# Patient Record
Sex: FEMALE | Race: WHITE
[De-identification: names, ages, dates, MRNs, and addresses within clinical notes are randomized per-mention and may not be internally consistent; named-entity substitution may affect disease eponyms.]

---

## 2017-06-29 ENCOUNTER — HOSPITAL ENCOUNTER (OUTPATIENT)
Dept: HOSPITAL 53 - M LAB REF | Age: 72
End: 2017-06-29
Attending: NURSE PRACTITIONER
Payer: COMMERCIAL

## 2017-06-29 DIAGNOSIS — M25.561: Primary | ICD-10-CM

## 2017-06-29 LAB — URATE SERPL-MCNC: 5.8 MG/DL (ref 2.6–6)

## 2017-11-30 ENCOUNTER — HOSPITAL ENCOUNTER (OUTPATIENT)
Dept: HOSPITAL 53 - M SLEEP | Age: 72
End: 2017-11-30
Attending: NURSE PRACTITIONER
Payer: COMMERCIAL

## 2017-11-30 DIAGNOSIS — G47.33: Primary | ICD-10-CM

## 2017-11-30 DIAGNOSIS — R40.0: ICD-10-CM

## 2017-12-02 NOTE — SLEEPCENT
DATE OF PROCEDURE:   11/30/2017

 

ORDERED BY:  Meghan Cuevas

 

Nocturnal polysomnography was performed for re-titration of pressure therapy in

this patient with a diagnosis of obstructive sleep apnea syndrome, currently

using continuous positive airway pressure (CPAP).

 

For testing, a ResMed AirFit F10 full face mask of small size was used. 12 cm of

water pressure were applied to the circuit and the lights were extinguished. 7

hours and 38 minutes of data were reviewed. There were 406 minutes of sleep

identified. Sleep latency was short at 9.5 minutes. Rapid eye movement (REM)

latency was short at 94 minutes. Sleep architecture was good with 3 REM cycles.

Overall sleep efficiency was 90%. The patient's electrocardiogram showed a sinus

rhythm with an average heart rate of 56 beats per minute. EEG showed normal

waveforms for  awake and sleep. A slight increase in continuous positive airway

pressure (CPAP) pressure to 13 resulted in resolution of respiratory events.

There was some mild snoring late in the study, but no sleep disruption. There was

some limb activity, but limb movement arousal index was 3.3. There were no oxygen

desaturations on CPAP.

 

IMPRESSION:  Obstructive sleep apnea syndrome (G47.33).

 

RECOMMENDATION:  Nightly use of pressure therapy, 13 cm of water.

 

 

Copy To:  Dr. Rowland

## 2018-11-15 ENCOUNTER — HOSPITAL ENCOUNTER (OUTPATIENT)
Dept: HOSPITAL 53 - M LAB REF | Age: 73
End: 2018-11-15
Attending: FAMILY MEDICINE
Payer: COMMERCIAL

## 2018-11-15 DIAGNOSIS — Z11.59: Primary | ICD-10-CM

## 2018-11-15 PROCEDURE — 86803 HEPATITIS C AB TEST: CPT

## 2018-11-16 LAB — HCV AB SER QL: 0.1 INDEX (ref ?–0.8)

## 2018-12-14 ENCOUNTER — HOSPITAL ENCOUNTER (OUTPATIENT)
Dept: HOSPITAL 53 - M WHC | Age: 73
End: 2018-12-14
Attending: FAMILY MEDICINE
Payer: COMMERCIAL

## 2018-12-14 DIAGNOSIS — Z98.890: ICD-10-CM

## 2018-12-14 DIAGNOSIS — Z12.31: Primary | ICD-10-CM

## 2018-12-14 DIAGNOSIS — Z86.018: ICD-10-CM

## 2018-12-14 PROCEDURE — 77067 SCR MAMMO BI INCL CAD: CPT

## 2019-10-31 ENCOUNTER — HOSPITAL ENCOUNTER (OUTPATIENT)
Dept: HOSPITAL 53 - M OPP | Age: 74
Discharge: HOME | End: 2019-10-31
Attending: SURGERY
Payer: MEDICARE

## 2019-10-31 VITALS — BODY MASS INDEX: 36.5 KG/M2 | HEIGHT: 63 IN | WEIGHT: 206 LBS

## 2019-10-31 VITALS — SYSTOLIC BLOOD PRESSURE: 113 MMHG | DIASTOLIC BLOOD PRESSURE: 74 MMHG

## 2019-10-31 DIAGNOSIS — I10: ICD-10-CM

## 2019-10-31 DIAGNOSIS — D12.2: ICD-10-CM

## 2019-10-31 DIAGNOSIS — D12.0: ICD-10-CM

## 2019-10-31 DIAGNOSIS — Z79.899: ICD-10-CM

## 2019-10-31 DIAGNOSIS — K57.30: ICD-10-CM

## 2019-10-31 DIAGNOSIS — G47.30: ICD-10-CM

## 2019-10-31 DIAGNOSIS — E03.9: ICD-10-CM

## 2019-10-31 DIAGNOSIS — Z12.11: Primary | ICD-10-CM

## 2019-10-31 NOTE — ROOR
________________________________________________________________________________

Patient Name: Maricarmen Payne           Procedure Date: 10/31/2019 1:56 PM

MRN: [id number]                          Account Number: [id number]

YOB: 1945               Age: 73

Room: [facility]                            Gender: Female

Note Status: Finalized                 

________________________________________________________________________________

 

Procedure:           Colonoscopy

Indications:         Screening for colorectal malignant neoplasm

Providers:           Leo J. Gosselin Jr, MD

Referring MD:        Adan Rowland MD

Requesting Provider: 

Medicines:           Propofol per Anesthesia

Complications:       No immediate complications.

________________________________________________________________________________

Procedure:           Pre-Anesthesia Assessment:

                     - Prior to the procedure, a History and Physical was 

                     performed, and patient medications and allergies were 

                     reviewed. The patient is competent. The risks and 

                     benefits of the procedure and the sedation options and 

                     risks were discussed with the patient. All questions were 

                     answered and informed consent was obtained. Patient 

                     identification and proposed procedure were verified by 

                     the physician and the nurse in the pre-procedure area and 

                     in the procedure room. Mental Status Examination: alert 

                     and oriented. Airway Examination: normal oropharyngeal 

                     airway and neck mobility. Respiratory Examination: clear 

                     to auscultation. CV Examination: normal. ASA Grade 

                     Assessment: II - A patient with mild systemic disease. 

                     After reviewing the risks and benefits, the patient was 

                     deemed in satisfactory condition to undergo the 

                     procedure. The anesthesia plan was to use moderate 

                     sedation / analgesia (conscious sedation). Immediately 

                     prior to administration of medications, the patient was 

                     re-assessed for adequacy to receive sedatives. The heart 

                     rate, respiratory rate, oxygen saturations, blood 

                     pressure, adequacy of pulmonary ventilation, and response 

                     to care were monitored throughout the procedure. The 

                     physical status of the patient was re-assessed after the 

                     procedure.

                     The Colonoscope was introduced through the anus and 

                     advanced to the cecum, identified by appendiceal orifice 

                     and ileocecal valve. The colonoscopy was performed 

                     without difficulty. The patient tolerated the procedure 

                     well. The quality of the bowel preparation was poor.

                                                                                

Findings:

     Two polyps were found in the ascending colon and cecum. The polyps were 

     medium in size. These polyps were removed with a hot snare. Resection and 

     retrieval were complete. To close a defect after polypectomy, one 

     hemostatic clip was successfully placed. There was no bleeding at the end 

     of the procedure.

     Many small and large-mouthed diverticula were found in the sigmoid colon.

     The rectum, recto-sigmoid colon, descending colon and transverse colon 

     appeared normal.

                                                                                

Impression:          - Preparation of the colon was poor.

                     - Two medium polyps in the ascending colon and in the 

                     cecum, removed with a hot snare. Resected and retrieved. 

                     Clip was placed.

                     - Diverticulosis in the sigmoid colon.

                     - The rectum, recto-sigmoid colon, descending colon and 

                     transverse colon are normal.

Recommendation:      - Discharge patient to home (ambulatory).

                     - Repeat colonoscopy in 5-10 years for surveillance based 

                     on pathology results.

                     - Telephone my office for pathology results in 1 week.

                                                                                

 

Leo Gosselin MD

_____________________

Leo J Gosselin Jr, MD

10/31/2019 2:23:38 PM

Electronically signed by Leo Gosselin Jr , MD

Number of Addenda: 0

 

Note Initiated On: 10/31/2019 1:56 PM

Estimated Blood Loss:

     Estimated blood loss: none.

## 2021-03-12 ENCOUNTER — HOSPITAL ENCOUNTER (OUTPATIENT)
Dept: HOSPITAL 53 - M WHC | Age: 76
End: 2021-03-12
Attending: FAMILY MEDICINE
Payer: MEDICARE

## 2021-03-12 DIAGNOSIS — Z98.890: ICD-10-CM

## 2021-03-12 DIAGNOSIS — Z80.41: ICD-10-CM

## 2021-03-12 DIAGNOSIS — Z86.018: ICD-10-CM

## 2021-03-12 DIAGNOSIS — Z12.31: Primary | ICD-10-CM

## 2021-03-12 NOTE — REPMRS
Patient History

The patient states she has not had a clinical breast exam in over

a year.

Familily history of ovarian cancer at age 50 or over in paternal

cousin, ovarian cancer under age 50 in niece.

Benign stereotatic loc for ea lesion of the left breast, April 26, 2012.  Reductions of both breasts, 1994.

 

Digital Woman Screen Mammo: March 12, 2021 - Exam #: 

[id number]

Bilateral CC and MLO view(s) were taken.

 

Technologist: Melvi Kenny, Technologist

Prior study comparison: December 14, 2018, bilateral digital 

woman screen mammo performed at [facility] 

Breast [facility].  January 21, 2016, digital woman screen mammo

performed at [facility]. 

June 18, 2014, digital woman screen mammo performed at [facility].

 

FINDINGS:  The breast tissue is almost entirely fat.  The Volpara

volumetric breast density category is: A.  There has been no 

change in the appearance of the mammogram from the prior studies.

There is no interval development of dominant mass, 

architectural distortion, or grouped microcalcification typical 

of malignancy.

3-D tomosynthesis shows no additional findings.

 

Assessment: BI-RADS/ACR category 1 mammogram. Negative Mammogram.

 

Recommendation

Routine screening mammogram of both breasts in 1 year (for women 

over age 40).

This patient's [facility] Lifetime Breast Cancer RIsk is 

estimated at 2.7 %.

This mammogram was interpreted with the aid of an FDA-approved 

computer-aided dectection system.

 

Electronically Signed By: José Miguel Kessler MD 03/12/21 0844

## 2022-04-07 ENCOUNTER — HOSPITAL ENCOUNTER (OUTPATIENT)
Dept: HOSPITAL 53 - M EKG | Age: 77
End: 2022-04-07
Attending: FAMILY MEDICINE
Payer: MEDICARE

## 2022-04-07 DIAGNOSIS — R00.0: Primary | ICD-10-CM

## 2022-04-07 DIAGNOSIS — R00.2: ICD-10-CM

## 2022-04-25 ENCOUNTER — HOSPITAL ENCOUNTER (OUTPATIENT)
Dept: HOSPITAL 53 - M WHC | Age: 77
End: 2022-04-25
Attending: FAMILY MEDICINE
Payer: MEDICARE

## 2022-04-25 DIAGNOSIS — Z12.31: Primary | ICD-10-CM

## 2022-09-09 ENCOUNTER — HOSPITAL ENCOUNTER (OUTPATIENT)
Dept: HOSPITAL 53 - M LAB REF | Age: 77
End: 2022-09-09
Attending: FAMILY MEDICINE
Payer: MEDICARE

## 2022-09-09 DIAGNOSIS — N39.0: Primary | ICD-10-CM

## 2022-09-22 ENCOUNTER — HOSPITAL ENCOUNTER (OUTPATIENT)
Dept: HOSPITAL 53 - M SMT | Age: 77
End: 2022-09-22
Attending: NURSE PRACTITIONER
Payer: MEDICARE

## 2022-09-22 DIAGNOSIS — R31.0: Primary | ICD-10-CM

## 2022-09-22 LAB
APPEARANCE UR: (no result)
BACTERIA URNS QL MICRO: (no result)
BILIRUB UR QL STRIP: NEGATIVE
COLOR UR: (no result)
GLUCOSE UR STRIP-MCNC: NEGATIVE MG/DL
HGB UR QL STRIP: POSITIVE
HYALINE CASTS URNS QL MICRO: (no result) /LPF (ref 0–1)
KETONES UR QL STRIP: NEGATIVE MG/DL
LEUKOCYTE ESTERASE UR QL STRIP: (no result)
MUCOUS THREADS URNS QL MICRO: (no result)
NITRITE UR QL STRIP: NEGATIVE
PH UR STRIP: 8 UNITS (ref 5–7)
PROT UR STRIP-MCNC: (no result) MG/DL
RBC #/AREA URNS HPF: (no result) /HPF (ref 0–3)
SP GR UR STRIP: 1.01 (ref 1–1.03)
SQUAMOUS URNS QL MICRO: (no result) /HPF
UROBILINOGEN UR QL STRIP: NORMAL MG/DL
WBC #/AREA URNS HPF: (no result) /HPF (ref 0–3)

## 2022-09-30 ENCOUNTER — HOSPITAL ENCOUNTER (OUTPATIENT)
Dept: HOSPITAL 53 - M RAD | Age: 77
End: 2022-09-30
Attending: NURSE PRACTITIONER
Payer: MEDICARE

## 2022-09-30 DIAGNOSIS — N20.0: ICD-10-CM

## 2022-09-30 DIAGNOSIS — N28.1: ICD-10-CM

## 2022-09-30 DIAGNOSIS — R31.0: Primary | ICD-10-CM

## 2022-09-30 PROCEDURE — 74178 CT ABD&PLV WO CNTR FLWD CNTR: CPT

## 2022-10-16 ENCOUNTER — HOSPITAL ENCOUNTER (OUTPATIENT)
Dept: HOSPITAL 53 - M LABSMTC | Age: 77
End: 2022-10-16
Attending: ANESTHESIOLOGY
Payer: MEDICARE

## 2022-10-16 DIAGNOSIS — Z01.812: Primary | ICD-10-CM

## 2022-10-16 DIAGNOSIS — Z11.52: ICD-10-CM

## 2022-10-18 ENCOUNTER — HOSPITAL ENCOUNTER (OUTPATIENT)
Dept: HOSPITAL 53 - M LAB REF | Age: 77
End: 2022-10-18
Attending: FAMILY MEDICINE
Payer: MEDICARE

## 2022-10-18 DIAGNOSIS — Z01.818: Primary | ICD-10-CM

## 2022-10-18 LAB
APPEARANCE UR: (no result)
BACTERIA URNS QL MICRO: (no result)
BILIRUB UR QL STRIP: NEGATIVE
COLOR UR: YELLOW
GLUCOSE UR STRIP-MCNC: NEGATIVE MG/DL
HGB UR QL STRIP: NEGATIVE
HYALINE CASTS URNS QL MICRO: (no result) /LPF (ref 0–1)
KETONES UR QL STRIP: NEGATIVE MG/DL
LEUKOCYTE ESTERASE UR QL STRIP: (no result)
NITRITE UR QL STRIP: NEGATIVE
PH UR STRIP: 5.5 UNITS (ref 5–7)
PROT UR STRIP-MCNC: NEGATIVE MG/DL
RBC #/AREA URNS HPF: (no result) /HPF (ref 0–3)
SP GR UR STRIP: 1 (ref 1–1.03)
SQUAMOUS URNS QL MICRO: (no result) /HPF
UROBILINOGEN UR QL STRIP: NORMAL MG/DL
WBC #/AREA URNS HPF: (no result) /HPF (ref 0–3)

## 2022-10-20 ENCOUNTER — HOSPITAL ENCOUNTER (OUTPATIENT)
Dept: HOSPITAL 53 - M SDC | Age: 77
Discharge: HOME | End: 2022-10-20
Attending: UROLOGY
Payer: MEDICARE

## 2022-10-20 VITALS — WEIGHT: 218 LBS | BODY MASS INDEX: 38.62 KG/M2 | HEIGHT: 63 IN

## 2022-10-20 VITALS — SYSTOLIC BLOOD PRESSURE: 133 MMHG | DIASTOLIC BLOOD PRESSURE: 68 MMHG

## 2022-10-20 DIAGNOSIS — Z95.1: ICD-10-CM

## 2022-10-20 DIAGNOSIS — Z79.899: ICD-10-CM

## 2022-10-20 DIAGNOSIS — Z79.01: ICD-10-CM

## 2022-10-20 DIAGNOSIS — I10: ICD-10-CM

## 2022-10-20 DIAGNOSIS — G47.33: ICD-10-CM

## 2022-10-20 DIAGNOSIS — N20.0: Primary | ICD-10-CM

## 2022-10-20 DIAGNOSIS — K57.92: ICD-10-CM

## 2022-10-20 DIAGNOSIS — E78.5: ICD-10-CM

## 2022-10-20 PROCEDURE — 50590 FRAGMENTING OF KIDNEY STONE: CPT

## 2022-10-20 PROCEDURE — 74018 RADEX ABDOMEN 1 VIEW: CPT

## 2022-11-09 ENCOUNTER — HOSPITAL ENCOUNTER (OUTPATIENT)
Dept: HOSPITAL 53 - M RAD | Age: 77
End: 2022-11-09
Attending: PHYSICIAN ASSISTANT
Payer: MEDICARE

## 2022-11-09 DIAGNOSIS — Z48.816: Primary | ICD-10-CM

## 2022-11-09 DIAGNOSIS — N28.89: ICD-10-CM

## 2022-11-18 LAB
AMM URATE MFR STONE: (no result) %
CA H2 PHOS DIHYD MFR STONE: (no result) %
CALCIUM OXALATE DIHYDRATE MFR STONE IR: (no result) %
CELL MATERIAL STONE EST-MCNT: (no result) %
CHOLEST SERPL-MCNC: (no result) MG/DL
COM MFR STONE: (no result) %
NA URATE MFR STONE IR: (no result) %
URATE DIHYD MFR STONE: (no result) %
URATE SERPL-MCNC: (no result) MG/DL

## 2023-08-01 ENCOUNTER — HOSPITAL ENCOUNTER (OUTPATIENT)
Dept: HOSPITAL 53 - M LAB REF | Age: 78
End: 2023-08-01
Payer: MEDICARE

## 2023-08-01 DIAGNOSIS — N39.0: Primary | ICD-10-CM

## 2023-08-08 ENCOUNTER — HOSPITAL ENCOUNTER (OUTPATIENT)
Dept: HOSPITAL 53 - M LAB REF | Age: 78
End: 2023-08-08
Payer: MEDICARE

## 2023-08-08 DIAGNOSIS — R31.9: Primary | ICD-10-CM

## 2023-08-21 ENCOUNTER — HOSPITAL ENCOUNTER (OUTPATIENT)
Dept: HOSPITAL 53 - M LAB REF | Age: 78
End: 2023-08-21
Attending: FAMILY MEDICINE
Payer: MEDICARE

## 2023-08-21 DIAGNOSIS — R31.9: Primary | ICD-10-CM

## 2025-05-15 ENCOUNTER — HOSPITAL ENCOUNTER (OUTPATIENT)
Dept: HOSPITAL 53 - M LAB REF | Age: 80
End: 2025-05-15
Attending: NURSE PRACTITIONER
Payer: MEDICARE

## 2025-05-15 DIAGNOSIS — R30.0: Primary | ICD-10-CM
